# Patient Record
Sex: MALE | Race: WHITE | Employment: UNEMPLOYED | URBAN - NONMETROPOLITAN AREA
[De-identification: names, ages, dates, MRNs, and addresses within clinical notes are randomized per-mention and may not be internally consistent; named-entity substitution may affect disease eponyms.]

---

## 2024-01-01 ENCOUNTER — PREP FOR PROCEDURE (OUTPATIENT)
Dept: UROLOGY | Age: 64
End: 2024-01-01

## 2024-01-01 RX ORDER — SODIUM CHLORIDE 0.9 % (FLUSH) 0.9 %
5-40 SYRINGE (ML) INJECTION PRN
Status: CANCELLED | OUTPATIENT
Start: 2024-01-01

## 2024-01-01 RX ORDER — SODIUM CHLORIDE 9 MG/ML
INJECTION, SOLUTION INTRAVENOUS PRN
Status: CANCELLED | OUTPATIENT
Start: 2024-01-01

## 2024-01-01 RX ORDER — SODIUM CHLORIDE 0.9 % (FLUSH) 0.9 %
5-40 SYRINGE (ML) INJECTION EVERY 12 HOURS SCHEDULED
Status: CANCELLED | OUTPATIENT
Start: 2024-01-01

## 2024-08-05 ENCOUNTER — OFFICE VISIT (OUTPATIENT)
Dept: ONCOLOGY | Age: 64
End: 2024-08-05
Payer: COMMERCIAL

## 2024-08-05 ENCOUNTER — HOSPITAL ENCOUNTER (OUTPATIENT)
Dept: INFUSION THERAPY | Age: 64
Discharge: HOME | End: 2024-08-05
Payer: COMMERCIAL

## 2024-08-05 VITALS
SYSTOLIC BLOOD PRESSURE: 134 MMHG | RESPIRATION RATE: 18 BRPM | BODY MASS INDEX: 32.47 KG/M2 | DIASTOLIC BLOOD PRESSURE: 87 MMHG | OXYGEN SATURATION: 98 % | HEIGHT: 73 IN | HEART RATE: 107 BPM | WEIGHT: 245 LBS | TEMPERATURE: 98.3 F

## 2024-08-05 VITALS
TEMPERATURE: 98.3 F | SYSTOLIC BLOOD PRESSURE: 134 MMHG | DIASTOLIC BLOOD PRESSURE: 87 MMHG | HEART RATE: 107 BPM | RESPIRATION RATE: 18 BRPM

## 2024-08-05 DIAGNOSIS — C79.51 METASTATIC CARCINOMA TO BONE (HCC): ICD-10-CM

## 2024-08-05 DIAGNOSIS — F32.A DEPRESSION, UNSPECIFIED DEPRESSION TYPE: ICD-10-CM

## 2024-08-05 DIAGNOSIS — C79.51 METASTATIC CARCINOMA TO BONE (HCC): Primary | ICD-10-CM

## 2024-08-05 DIAGNOSIS — I10 PRIMARY HYPERTENSION: ICD-10-CM

## 2024-08-05 LAB
ALBUMIN SERPL BCG-MCNC: 4.3 G/DL (ref 3.5–5.1)
ALP SERPL-CCNC: 912 U/L (ref 38–126)
ALT SERPL W/O P-5'-P-CCNC: 21 U/L (ref 11–66)
ANION GAP SERPL CALC-SCNC: 13 MEQ/L (ref 8–16)
APTT PPP: 33.5 SECONDS (ref 22–38)
AST SERPL-CCNC: 38 U/L (ref 5–40)
BASOPHILS ABSOLUTE: 0.1 THOU/MM3 (ref 0–0.1)
BASOPHILS NFR BLD AUTO: 0.8 %
BILIRUB CONJ SERPL-MCNC: < 0.1 MG/DL (ref 0.1–13.8)
BILIRUB SERPL-MCNC: 0.3 MG/DL (ref 0.3–1.2)
BUN SERPL-MCNC: 33 MG/DL (ref 7–22)
BURR CELLS BLD QL SMEAR: ABNORMAL
CALCIUM SERPL-MCNC: 10.9 MG/DL (ref 8.5–10.5)
CHLORIDE SERPL-SCNC: 97 MEQ/L (ref 98–111)
CO2 SERPL-SCNC: 27 MEQ/L (ref 23–33)
CREAT SERPL-MCNC: 1.2 MG/DL (ref 0.4–1.2)
DEPRECATED RDW RBC AUTO: 43.1 FL (ref 35–45)
EOSINOPHIL NFR BLD AUTO: 2.7 %
EOSINOPHILS ABSOLUTE: 0.3 THOU/MM3 (ref 0–0.4)
ERYTHROCYTE [DISTWIDTH] IN BLOOD BY AUTOMATED COUNT: 14.3 % (ref 11.5–14.5)
FOLATE SERPL-MCNC: 16.8 NG/ML (ref 4.8–24.2)
GFR SERPL CREATININE-BSD FRML MDRD: 68 ML/MIN/1.73M2
GLUCOSE SERPL-MCNC: 89 MG/DL (ref 70–108)
HCT VFR BLD AUTO: 31.9 % (ref 42–52)
HGB BLD-MCNC: 9.8 GM/DL (ref 14–18)
HGB RETIC QN AUTO: 27.7 PG (ref 28.2–35.7)
IMM GRANULOCYTES # BLD AUTO: 0.61 THOU/MM3 (ref 0–0.07)
IMM GRANULOCYTES NFR BLD AUTO: 5.6 %
IMM RETICS NFR: 31.8 % (ref 2.3–13.4)
INR PPP: 1.05 (ref 0.85–1.13)
LDH SERPL L TO P-CCNC: 599 U/L (ref 100–190)
LYMPHOCYTES ABSOLUTE: 1.8 THOU/MM3 (ref 1–4.8)
LYMPHOCYTES NFR BLD AUTO: 16.6 %
MAGNESIUM SERPL-MCNC: 2.1 MG/DL (ref 1.6–2.4)
MCH RBC QN AUTO: 25.7 PG (ref 26–33)
MCHC RBC AUTO-ENTMCNC: 30.7 GM/DL (ref 32.2–35.5)
MCV RBC AUTO: 83.5 FL (ref 80–94)
MONOCYTES ABSOLUTE: 0.9 THOU/MM3 (ref 0.4–1.3)
MONOCYTES NFR BLD AUTO: 8.1 %
NEUTROPHILS ABSOLUTE: 7.1 THOU/MM3 (ref 1.8–7.7)
NEUTROPHILS NFR BLD AUTO: 66.2 %
NRBC BLD AUTO-RTO: 0 /100 WBC
PLATELET # BLD AUTO: 434 THOU/MM3 (ref 130–400)
PMV BLD AUTO: 9.7 FL (ref 9.4–12.4)
POLYCHROMASIA BLD QL SMEAR: ABNORMAL
POTASSIUM SERPL-SCNC: 4.1 MEQ/L (ref 3.5–5.2)
PROT SERPL-MCNC: 7.6 G/DL (ref 6.1–8)
RBC # BLD AUTO: 3.82 MILL/MM3 (ref 4.7–6.1)
RETICS # AUTO: 71 THOU/MM3 (ref 20–115)
RETICS/RBC NFR AUTO: 1.9 % (ref 0.5–2)
SCAN OF BLOOD SMEAR: NORMAL
SODIUM SERPL-SCNC: 137 MEQ/L (ref 135–145)
TSH SERPL DL<=0.005 MIU/L-ACNC: 3.02 UIU/ML (ref 0.4–4.2)
URATE SERPL-MCNC: 7.7 MG/DL (ref 3.7–7)
VIT B12 SERPL-MCNC: 1223 PG/ML (ref 211–911)
WBC # BLD AUTO: 10.8 THOU/MM3 (ref 4.8–10.8)

## 2024-08-05 PROCEDURE — 84403 ASSAY OF TOTAL TESTOSTERONE: CPT

## 2024-08-05 PROCEDURE — 80053 COMPREHEN METABOLIC PANEL: CPT

## 2024-08-05 PROCEDURE — 84443 ASSAY THYROID STIM HORMONE: CPT

## 2024-08-05 PROCEDURE — 99211 OFF/OP EST MAY X REQ PHY/QHP: CPT

## 2024-08-05 PROCEDURE — 82746 ASSAY OF FOLIC ACID SERUM: CPT

## 2024-08-05 PROCEDURE — 84156 ASSAY OF PROTEIN URINE: CPT

## 2024-08-05 PROCEDURE — 83735 ASSAY OF MAGNESIUM: CPT

## 2024-08-05 PROCEDURE — 82784 ASSAY IGA/IGD/IGG/IGM EACH: CPT

## 2024-08-05 PROCEDURE — 84155 ASSAY OF PROTEIN SERUM: CPT

## 2024-08-05 PROCEDURE — 85730 THROMBOPLASTIN TIME PARTIAL: CPT

## 2024-08-05 PROCEDURE — 83615 LACTATE (LD) (LDH) ENZYME: CPT

## 2024-08-05 PROCEDURE — 3075F SYST BP GE 130 - 139MM HG: CPT | Performed by: SPECIALIST

## 2024-08-05 PROCEDURE — 86335 IMMUNFIX E-PHORSIS/URINE/CSF: CPT

## 2024-08-05 PROCEDURE — 99205 OFFICE O/P NEW HI 60 MIN: CPT | Performed by: SPECIALIST

## 2024-08-05 PROCEDURE — 85025 COMPLETE CBC W/AUTO DIFF WBC: CPT

## 2024-08-05 PROCEDURE — 84165 PROTEIN E-PHORESIS SERUM: CPT

## 2024-08-05 PROCEDURE — 82607 VITAMIN B-12: CPT

## 2024-08-05 PROCEDURE — 3079F DIAST BP 80-89 MM HG: CPT | Performed by: SPECIALIST

## 2024-08-05 PROCEDURE — 84550 ASSAY OF BLOOD/URIC ACID: CPT

## 2024-08-05 PROCEDURE — 84166 PROTEIN E-PHORESIS/URINE/CSF: CPT

## 2024-08-05 PROCEDURE — 85610 PROTHROMBIN TIME: CPT

## 2024-08-05 PROCEDURE — 83521 IG LIGHT CHAINS FREE EACH: CPT

## 2024-08-05 PROCEDURE — 82248 BILIRUBIN DIRECT: CPT

## 2024-08-05 PROCEDURE — 36415 COLL VENOUS BLD VENIPUNCTURE: CPT

## 2024-08-05 PROCEDURE — 85046 RETICYTE/HGB CONCENTRATE: CPT

## 2024-08-05 PROCEDURE — 86334 IMMUNOFIX E-PHORESIS SERUM: CPT

## 2024-08-05 RX ORDER — BUPROPION HYDROCHLORIDE 150 MG/1
150 TABLET ORAL DAILY
COMMUNITY
Start: 2024-05-16

## 2024-08-05 RX ORDER — ENOXAPARIN SODIUM 100 MG/ML
40 INJECTION SUBCUTANEOUS DAILY
COMMUNITY
Start: 2024-07-30 | End: 2024-09-07

## 2024-08-05 RX ORDER — CYCLOBENZAPRINE HCL 5 MG
5 TABLET ORAL 3 TIMES DAILY PRN
COMMUNITY
Start: 2024-07-30

## 2024-08-05 RX ORDER — ROPINIROLE 0.5 MG/1
0.5 TABLET, FILM COATED ORAL 3 TIMES DAILY
COMMUNITY

## 2024-08-05 RX ORDER — AMLODIPINE BESYLATE 10 MG/1
10 TABLET ORAL DAILY
COMMUNITY
Start: 2024-07-25

## 2024-08-05 RX ORDER — OXYCODONE HYDROCHLORIDE 5 MG/1
5 TABLET ORAL EVERY 6 HOURS PRN
COMMUNITY
Start: 2024-07-30

## 2024-08-05 RX ORDER — OXYCODONE AND ACETAMINOPHEN 10; 325 MG/1; MG/1
TABLET ORAL
COMMUNITY
Start: 2024-07-23

## 2024-08-05 RX ORDER — SENNOSIDES 8.6 MG
650 CAPSULE ORAL EVERY 6 HOURS PRN
COMMUNITY

## 2024-08-05 RX ORDER — CITALOPRAM 20 MG/1
40 TABLET ORAL DAILY
COMMUNITY
Start: 2024-05-16

## 2024-08-05 RX ORDER — GABAPENTIN 100 MG/1
100 CAPSULE ORAL 3 TIMES DAILY
COMMUNITY
Start: 2024-07-30 | End: 2024-08-29

## 2024-08-05 NOTE — PATIENT INSTRUCTIONS
Urology consult ASAP for Prostate Biopsy  Palliative Care Consult ASAP   CBC/CCP/PSA/SPEP/FLC Assay/ spot UPEP/ QIG  Follow-up in 2 weeks - No Labs   Follow with [facility] for palliative Radiation as planned  Call with questions or concerns

## 2024-08-05 NOTE — PROGRESS NOTES
degenerative changes as detailed above. I personally viewed and interpreted these images and I have reviewed and approved this report. Electronically Signed By: Nino Goodson M.D. on 7/29/2024 3:38 PM    XR SPINE SCOLIOSIS 2-3 VIEWS    Result Date: 7/28/2024  IMPRESSION:   1.  Extensive lytic and sclerotic osseous metastatic disease in the spine with pathologic compression fractures at T12 and L3 with mild height loss. Metastatic lesions are better evaluated on recent spine CT and MRI. 2.  Multilevel degenerative disc disease in the thoracic and lumbar spine. 3.  No significant spinal scoliosis. No pelvic tilt. Electronically Signed By: Christopher Kanner, MD on 7/28/2024 4:47 PM    MRI FEMUR LEFT W WO CONTRAST    Result Date: 7/28/2024  IMPRESSION: 1.  Status post prophylactic ORIF of the left femur. Susceptibility artifact associated with fixation hardware obscures adjacent bone and soft tissue. 2.  Multiple metastatic lesions in the left pelvis and left femur. Dominant proximal left femur lesion filling the majority of the medullary space extending from the left femoral neck to the subtrochanteric region. Areas of endosteal scalloping along the shaft more distally at the left femur also compatible with metastatic disease.   3.  Avulsed pathologic fracture at the left iliopsoas tendon insertion on the left femoral lesser trochanter. Adjacent edema in the left iliopsoas and left hip adductor musculature. 4.  Additional edema in the left gluteal and left proximal quadriceps musculature likely secondary to recent surgical intervention. 5.  Small T2 hyperintense postoperative collection superior to the left femoral greater trochanter. Electronically Signed By: Fabricio Logan M.D. on 7/28/2024 11:47 AM    CT SPINE CERVICAL THORACIC LUMBAR WITHOUT CONTRAST    Result Date: 7/28/2024  IMPRESSION: Diffuse vertebral metastases which are mixed osteolytic and osteoblastic, including multiple areas of lytic cortical

## 2024-08-06 ENCOUNTER — SOCIAL WORK (OUTPATIENT)
Dept: INFUSION THERAPY | Age: 64
End: 2024-08-06

## 2024-08-06 ENCOUNTER — CLINICAL DOCUMENTATION (OUTPATIENT)
Dept: CASE MANAGEMENT | Age: 64
End: 2024-08-06

## 2024-08-06 LAB
IGA SERPL-MCNC: 136 MG/DL (ref 70–400)
IGG SERPL-MCNC: 854 MG/DL (ref 700–1600)
IGM SERPL-MCNC: 61 MG/DL (ref 40–230)

## 2024-08-06 NOTE — PROGRESS NOTES
Name: Serafin Waggoner  : 1960  MRN: [id number]    Oncology Navigation- Initial Note:    Intake-  Contact Type: Medical Oncology    Diagnosis: Metastatic cancer    Home Disposition: Lives alone    Sister listed as emergency contact    Independent Self Care:  requesting  transportation assistance d/t challeneges with his vision, making driving difficult.  +Back Pain & Left hip pain.  SW evaluation completed.    Durable Medical Equipment:  no    Patient needs and barriers to care: Coordination of Care, Knowledge deficit, and Symptom Management     Referral Source: Outpatient    Receptive to Advanced Care Planning/ Palliative Care:  ordered ASAP d/t pain issues    Interventions-   Oncologist Plan of Care:    -Urology consult for prostate BX  -Palliative care consult ASAP for sx management  -FU at [facility] for Palli XRT as planned  -FU 2 weeks with ONC        Plan of Care Reviewed:  yes by ONC    Referrals: Supportive Therapies  to be discussed    Navigation:  Welcome Packet will be reviewed including contact information at next visit, as pt left without opportunity for Zackery to provide..    Education:  yes, by ONC     Currently on ADT: no     Continuum of Care: Diagnosis/Active Treatment- work up is needed to identify primary.    Notes: Zackery is available to assist as needed.  Zackery call to pt this evening; no answer; vm message left with Zackery's name & return call contact number.   Welcome packet will be placed in the mail for pt reference.       Electronically signed by Annette Andrews RN on 2024 at 6:50 PM

## 2024-08-06 NOTE — PROGRESS NOTES
Oncology Social Work    Date: 8/6/2024  Time: 2:31 PM  Name: Serafin Waggoner  MRN: [id number]     Contact Type: Follow-up    Note:   Situation: This staff called Serafin Waggoner via phone support to introduce myself as his Oncology Social Worker.     Background:  Serafin was referred to this staff by the [facility] the [facility]. This staff was calling to review his outstanding concerns identified on his coping thermometer.    Assessment: : This staff had the opportunity to speak with Serafin. He appeared a bit distracted while we talked through his distress indicators. He was pleasant as while I explained the purpose of the call was to provide him with education regarding the services provided by the .   - He shared the reason for his transportation need is due to an eye condition. He was encouraged to contact the [place] [employer] for rides. He was familiar with them since his mom had used them.   - Serafin does not have his Advance Directive filed with Medical Records but thinks he has a copy. He was asked to bring a copy to our facility for filing   - Referred him to his area [employer] ([facility]) for additional support should he want to pursue that avenue.    Recommendation: Follow-up will be initiated by Serafin based on need.  provided him with my contact information and will remain available for support.        JUAN Wong, [place], JESSICA  Oncology Social Worker      Electronically signed by JUAN Wong LSW, ACHP-SW on 8/6/2024 at 2:31 PM

## 2024-08-06 NOTE — RESULT ENCOUNTER NOTE
I have reviewed the above results and will continue with the plan as is. Calcium is mildly elevated and patient has no mental status changes and this is likely sec to bone mets

## 2024-08-07 LAB — KAPPA/LAMBDA FREE LIGHT CHAINS: NORMAL

## 2024-08-08 ENCOUNTER — TELEPHONE (OUTPATIENT)
Dept: CASE MANAGEMENT | Age: 64
End: 2024-08-08

## 2024-08-08 LAB — TESTOST SERPL-MCNC: 105 NG/DL (ref 300–720)

## 2024-08-08 NOTE — TELEPHONE ENCOUNTER
Zackery received call from RANDEE Reed for pt.  Return call to Brianna: no answer; vm message left introducing navigation & my contact number.

## 2024-08-09 ENCOUNTER — TELEPHONE (OUTPATIENT)
Dept: CASE MANAGEMENT | Age: 64
End: 2024-08-09

## 2024-08-09 LAB
IMMUNOFIXATION WITH QUANT: NORMAL
PROTEIN ELECTROPHORESIS, URINE: NORMAL

## 2024-08-09 NOTE — TELEPHONE ENCOUNTER
Zackery received call from pt's DPOA, sister, Brianna.  She reports pt has pain issues, & his Palliative Care consult is 9/19 & he will be out  Oxycodone before then.     Zackery call to [facility], spoke with Beata, who shares pt can be added to cancellation list, however the schedule is booked.      EMR message to ONC providers for direction.

## 2024-08-15 ENCOUNTER — OFFICE VISIT (OUTPATIENT)
Dept: UROLOGY | Age: 64
End: 2024-08-15
Payer: COMMERCIAL

## 2024-08-15 VITALS
BODY MASS INDEX: 32.47 KG/M2 | HEIGHT: 73 IN | DIASTOLIC BLOOD PRESSURE: 68 MMHG | SYSTOLIC BLOOD PRESSURE: 138 MMHG | WEIGHT: 245 LBS

## 2024-08-15 DIAGNOSIS — R97.20 ELEVATED PSA: Primary | ICD-10-CM

## 2024-08-15 DIAGNOSIS — R31.21 ASYMPTOMATIC MICROSCOPIC HEMATURIA: ICD-10-CM

## 2024-08-15 PROCEDURE — 99204 OFFICE O/P NEW MOD 45 MIN: CPT | Performed by: UROLOGY

## 2024-08-15 NOTE — PROGRESS NOTES
Deshaun Bueno MD   Urology Clinic office Visit      Patient:  Serafin Waggoner  YOB: 1960  Date: 8/15/2024    HISTORY OF PRESENT ILLNESS:   The patient is a 64 y.o. male who presents today for evaluation of the following problem(s):      1. Elevated PSA    2. Asymptomatic microscopic hematuria           Overall the problem(s) : are worsening.  Associated Symptoms: No dysuria, gross hematuria.  Pain Severity:      Summary of old records: N/A  (Patient's old records, notes and chart reviewed and summarized above.)      Onset 7/2024  Wife spread bony mets of unknown origin  Here for  work up  PSA 12.9  Left femur bx- usure the origing, maybe  or GI  Had colonoscopy 3 year  UA 1+ 7/2024    CT 7/2024  1. Diffuse osseous metastatic disease.   2. Enlarged retroperitoneal and retrocrural lymph nodes concerning for   metastatic disease.   3. The prostate gland is lobulated and enlarged. Underlying malignancy cannot   be excluded given the patient's history of an elevated PSA.   4. Abnormal enhancement in the superior pole of the right kidney, concerning   for pyelonephritis.   5. There is a prominent left renal collecting system which could be related   to UPJ obstruction.   6. Nonobstructing stone in the midpole of the left kidney measures 1.6 cm.   7. Pathologic avulsion fracture from the lesser trochanter of the left hip   with displacement of the fracture fragments.   8. Pathologic fracture along the anterior right 6th rib.       Last several PSA's:  No results found for: \"PSA\"    Last total testosterone:  Lab Results   Component Value Date    TESTOSTERONE 105 (L) 08/05/2024       Urinalysis today:  No results found for this visit on 08/15/24.      Last BUN and creatinine:  Lab Results   Component Value Date    BUN 33 (H) 08/05/2024     Lab Results   Component Value Date    CREATININE 1.2 08/05/2024       Imaging Reviewed during this Office Visit:   (results were independently reviewed by

## 2024-08-16 ENCOUNTER — TELEPHONE (OUTPATIENT)
Dept: UROLOGY | Age: 64
End: 2024-08-16

## 2024-08-16 DIAGNOSIS — R97.20 ELEVATED PSA: Primary | ICD-10-CM

## 2024-08-16 DIAGNOSIS — R31.21 ASYMPTOMATIC MICROSCOPIC HEMATURIA: ICD-10-CM

## 2024-08-16 NOTE — TELEPHONE ENCOUNTER
Patient is scheduled for surgery with  on 8/29/2024. Surgery consent to be done on arrival. WILL DO URINALYSIS ON ARRIVAL. Surgery instructionsmailed to the patient.     Patient informed an adult over the age of 18 must be with them at the time of surgery and upon discharge

## 2024-08-16 NOTE — TELEPHONE ENCOUNTER
SURGERY SCHEDULING FORM   [address] [address]      Phone *[phone]   Surgical Scheduling Direct Line Phone *[phone] Fax *[phone]      Serafin Waggoner 1960 male    [address] [place] [address]   Marital Status:          Home Phone: [phone]      Cell Phone:    Telephone Information:   Mobile [phone]          Surgeon: Dr. Son       Surgery Date: 8/29/2024       Time: 10:30am    Procedure: Cystoscopy, Bilateral Retrograde Pyelogram, Transrectal Ultrasound with Prostate Biopsy    Diagnosis: Elevated PSA, asymptomatic microscopic hematuria     Important Medical History:  In [facility]    Special Inst/Equip:                      US order faxed to IR    CPT Codes:    57655,76584  Latex Allergy: No     Cardiac Device:  No    Anesthesia:  MAC          Admission Type:  Same Day                        Admit Prior to Day of Surgery: No    Case Location:  Main OR            Preadmission Testing:  Phone Call          PAT Date and Time:______________________________________________________    PAT Confirmation #: ______________________________________________________    Post Op Visit: ___________________________________________________________    Need Preop Cardiac Clearance: No    Does Patient have Cardiologist/physician?     none    Surgery Confirmation #: __________________________________________________    : ________________________   Date: __________________________     Insurance Company Name: [employer]

## 2024-08-16 NOTE — TELEPHONE ENCOUNTER
DO NOT TAKE  FISH OIL, MOBIC, IBUPROFEN, MOTRIN-LIKE DRUGS AND ANY MULTIVITAMINS OR OVER THE COUNTER SUPPLEMENTS 14 DAYS PRIOR TO SURGERY.    MUST HAVE AN ADULT OVER THE AGE OF 18 WITH YOU AT THE TIME OF THE DISCHARGE         Serafin Waggoner 1960     Surgical Physician: Dr. Son      You have been scheduled for the procedure marked below:      Surgery: Cystoscopy, Bilateral Retrograde Pyelogram, Transrectal Ultrasound with Prostate Biopsy         Date: 8/29/2024     Anesthesia:  MAC     Place of Service: [facility] --Second Floor Same Day Surgery         Arrive to same day surgery at:  8:30am  (Surgery time is subject to change)      INSTRUCTIONS AS MARKED BELOW:    1.  DO NOT eat or drink anything after midnight before surgery.  2.  We prefer you shower or bathe with an antibacterial soap (Dial) the morning of surgery.  3  Please bring a current medication list, photo ID and insurance card(s) with you  4. Okay to take Tylenol  5. Take blood pressure or heart medication as directed, if taken in the morning take with a small sip of water  6.The office will call you in 1-2 days after your procedure to schedule a follow up.            Date: 8/16/2024